# Patient Record
Sex: MALE | Race: BLACK OR AFRICAN AMERICAN | NOT HISPANIC OR LATINO | Employment: UNEMPLOYED | URBAN - METROPOLITAN AREA
[De-identification: names, ages, dates, MRNs, and addresses within clinical notes are randomized per-mention and may not be internally consistent; named-entity substitution may affect disease eponyms.]

---

## 2021-11-05 ENCOUNTER — HOSPITAL ENCOUNTER (EMERGENCY)
Facility: CLINIC | Age: 46
Discharge: HOME | End: 2021-11-05
Attending: EMERGENCY MEDICINE | Admitting: EMERGENCY MEDICINE

## 2021-11-05 VITALS
HEART RATE: 67 BPM | OXYGEN SATURATION: 100 % | DIASTOLIC BLOOD PRESSURE: 94 MMHG | WEIGHT: 190 LBS | TEMPERATURE: 98.6 F | SYSTOLIC BLOOD PRESSURE: 143 MMHG | RESPIRATION RATE: 18 BRPM

## 2021-11-05 DIAGNOSIS — H11.32 SUBCONJUNCTIVAL HEMORRHAGE OF LEFT EYE: ICD-10-CM

## 2021-11-05 PROCEDURE — 99283 EMERGENCY DEPT VISIT LOW MDM: CPT

## 2021-11-05 PROCEDURE — 250N000009 HC RX 250: Performed by: EMERGENCY MEDICINE

## 2021-11-05 RX ORDER — POLYMYXIN B SULFATE AND TRIMETHOPRIM 1; 10000 MG/ML; [USP'U]/ML
1-2 SOLUTION OPHTHALMIC
Qty: 4 ML | Refills: 0 | Status: SHIPPED | OUTPATIENT
Start: 2021-11-05 | End: 2021-11-12

## 2021-11-05 RX ORDER — TETRACAINE HYDROCHLORIDE 5 MG/ML
1 SOLUTION OPHTHALMIC ONCE
Status: COMPLETED | OUTPATIENT
Start: 2021-11-05 | End: 2021-11-05

## 2021-11-05 RX ADMIN — FLUORESCEIN SODIUM 600 MCG: 0.6 STRIP OPHTHALMIC at 14:39

## 2021-11-05 RX ADMIN — TETRACAINE HYDROCHLORIDE 1 DROP: 5 SOLUTION OPHTHALMIC at 14:39

## 2021-11-05 NOTE — ED PROVIDER NOTES
EMERGENCY DEPARTMENT ENCOUNTER      NAME: Scott Welhc  AGE: 46 year old male  YOB: 1975  MRN: [id number]  EVALUATION DATE & TIME: No admission date for patient encounter.    PCP: No primary care provider on file.    ED PROVIDER: Elie Hinojosa M.D.      Chief Complaint   Patient presents with     Eye Problem       FINAL IMPRESSION:  1. Subconjunctival hemorrhage of left eye        ED COURSE & MEDICAL DECISION MAKIN year old male presents to the Emergency Department for evaluation of left eye redness.  History of remote retinal detachments.  His reported visual acuity at baseline is light perception only.  He has redness to the entire inferior aspect of the left eye which is dense and consistent with a some conjunctival hemorrhage.  Fluorescein exam does not reveal any discrete areas of increased uptake.  There was no trauma.  He has baseline left upward eye deviation.  I recommended that he follows up as soon as he can with [facility]  given his complex history however I think his presentation today seems consistent with a simple subconjunctival hemorrhage.  He will be given Polytrim drops given the frequent infections and the complexity of his history.  He should follow-up as soon as possible in [facility].  Patient with the emergency department in stable addition    2:16 PM I met with the patient to gather history and to perform my initial exam. I discussed the plan for care while in the Emergency Department. PPE (gloves, eye protection, N95 mask) was worn by me during patient encounters while patient wore mask.   2:38 PM I re-evaluated and updated patient. We discussed plans for discharge and patient is agreeable.      MEDICATIONS GIVEN IN THE EMERGENCY:  Medications   tetracaine (PONTOCAINE) 0.5 % ophthalmic solution 1 drop (1 drop Left Eye Given 21)   fluorescein (FUL-JAME) ophthalmic strip STRP 600 mcg (600 mcg Left Eye Given 21)       NEW  PRESCRIPTIONS STARTED AT TODAY'S ER VISIT  Discharge Medication List as of 11/5/2021  2:55 PM      START taking these medications    Details   trimethoprim-polymyxin b (POLYTRIM) 66757-0.1 UNIT/ML-% ophthalmic solution Place 1-2 drops Into the left eye every 4 hours (while awake) for 7 days, Disp-4 mL, R-0, Local Print                =================================================================    HPI    Patient information was obtained from: Patient    Use of : N/A         Scott Welch is a 46 year old male without a pertinent history who presents to this ED by walk in for evaluation of left eye redness.    Patient reports left eye redness for the past couple of days that has progressively worsened. Associated pain with blinking or moving his left eye. Denies known injury or trauma. Patient states at baseline he sees shadows and lights out of his left eye due to detached retina. Denies pain or visual disturbance to right eye. Patient does not wear glasses or contacts. Patient denies additional medical concerns or complaints at this time.      REVIEW OF SYSTEMS   All systems reviewed and negative except as noted in HPI.    PAST MEDICAL HISTORY:  No past medical history on file.    PAST SURGICAL HISTORY:  No past surgical history on file.        CURRENT MEDICATIONS:    No current facility-administered medications for this encounter.     Current Outpatient Medications   Medication     trimethoprim-polymyxin b (POLYTRIM) 13635-1.1 UNIT/ML-% ophthalmic solution         ALLERGIES:  No Known Allergies    FAMILY HISTORY:  No family history on file.    SOCIAL HISTORY:   Social History     Socioeconomic History     Marital status: Single     Spouse name: Not on file     Number of children: Not on file     Years of education: Not on file     Highest education level: Not on file   Occupational History     Not on file   Tobacco Use     Smoking status: Not on file   Substance and Sexual Activity     Alcohol use:  Not on file     Drug use: Not on file     Sexual activity: Not on file   Other Topics Concern     Not on file   Social History Narrative     Not on file     Social Determinants of Health     Financial Resource Strain:      Difficulty of Paying Living Expenses:    Food Insecurity:      Worried About Running Out of Food in the Last Year:      Ran Out of Food in the Last Year:    Transportation Needs:      Lack of Transportation (Medical):      Lack of Transportation (Non-Medical):    Physical Activity:      Days of Exercise per Week:      Minutes of Exercise per Session:    Stress:      Feeling of Stress :    Social Connections:      Frequency of Communication with Friends and Family:      Frequency of Social Gatherings with Friends and Family:      Attends [employer] Services:      Active Member of Clubs or Organizations:      Attends Club or Organization Meetings:      Marital Status:    Intimate Partner Violence:      Fear of Current or Ex-Partner:      Emotionally Abused:      Physically Abused:      Sexually Abused:        VITALS:  BP (!) 143/94   Pulse 67   Temp 98.6  F (37  C) (Oral)   Resp 18   Wt 86.2 kg (190 lb)   SpO2 100%     PHYSICAL EXAM    Constitutional: Well developed, Well nourished, NAD.  HENT: Normocephalic, Atraumatic. Neck Supple.  Eyes: Right eye normal.  Left eye Lids and lashes normal.  There is chronic left upward deviation of the eye.  Conjunctiva is injected with some conjunctival hemorrhage extending from the medial aspect to the lateral aspect of the lower half of the eye.  Corneas clear.  Fluorescein exam with no areas of increased uptake.  Respiratory: Breathing comfortably on room air. Speaks full sentences easily. Lungs clear to ascultation.  Cardiovascular: Normal heart rate, Regular rhythm. No peripheral edema.  Abdomen: Soft  Musculoskeletal: Good range of motion in all major joints. No major deformities noted.  Integument: Warm, Dry.  Neurologic: Alert & awake, Normal motor  function, Normal sensory function, No focal deficits noted.   Psychiatric: Cooperative. Affect appropriate.      I, Rosa Mderic FordeFany, am serving as a scribe to document services personally performed by Dr. Elie Hinojosa, based on my observation and the provider's statements to me. I, Elie Hinojosa MD attest that Rosa M Soares is acting in a scribe capacity, has observed my performance of the services and has documented them in accordance with my direction.    Elie Hinojosa M.D.  Emergency Medicine  [facility] EMERGENCY ROOM  [address] [id number]  [phone]  Dept: [phone]     Elie Hinojosa MD  11/05/21 3187

## 2021-11-05 NOTE — DISCHARGE INSTRUCTIONS
He was seen today in the emergency department at [facility] for left eye redness.  Your evaluation revealed a subconjunctival hemorrhage which is usually caused by a burst blood vessel on the eyes.  This usually is not an acute eye or sight threatening process.  Given your complex history we would advise that you follow-up as soon as possible with the specialists at [facility] to have a full eye exam and review your symptoms next week.  In the meantime we are going to recommend a eyedrop with antimicrobial properties you can use every 4 hours while you are awake.

## 2021-11-05 NOTE — ED TRIAGE NOTES
patient reports that his L eye has been red for the past few days, no known injury.  Patient having vision is affected, Dr. Micaela MD assessing pt in triage.  Carin Kumar RN.......11/5/2021 2:22 PM

## 2022-01-20 ENCOUNTER — HOSPITAL ENCOUNTER (EMERGENCY)
Facility: CLINIC | Age: 47
Discharge: HOME | End: 2022-01-20
Attending: EMERGENCY MEDICINE | Admitting: EMERGENCY MEDICINE
Payer: OTHER GOVERNMENT

## 2022-01-20 VITALS
OXYGEN SATURATION: 99 % | SYSTOLIC BLOOD PRESSURE: 142 MMHG | RESPIRATION RATE: 16 BRPM | HEART RATE: 62 BPM | WEIGHT: 185 LBS | TEMPERATURE: 97.8 F | DIASTOLIC BLOOD PRESSURE: 80 MMHG

## 2022-01-20 DIAGNOSIS — U07.1 INFECTION DUE TO 2019 NOVEL CORONAVIRUS: ICD-10-CM

## 2022-01-20 LAB
FLUAV RNA SPEC QL NAA+PROBE: NEGATIVE
FLUBV RNA RESP QL NAA+PROBE: NEGATIVE
SARS-COV-2 RNA RESP QL NAA+PROBE: POSITIVE

## 2022-01-20 PROCEDURE — C9803 HOPD COVID-19 SPEC COLLECT: HCPCS

## 2022-01-20 PROCEDURE — 99283 EMERGENCY DEPT VISIT LOW MDM: CPT

## 2022-01-20 PROCEDURE — 87636 SARSCOV2 & INF A&B AMP PRB: CPT | Performed by: EMERGENCY MEDICINE

## 2022-01-20 ASSESSMENT — ENCOUNTER SYMPTOMS
MYALGIAS: 1
SHORTNESS OF BREATH: 0
COUGH: 1
HEADACHES: 1
SORE THROAT: 1
FATIGUE: 1

## 2022-01-20 NOTE — ED PROVIDER NOTES
EMERGENCY DEPARTMENT ENCOUNTER      NAME: Scott Welch Jr.  AGE: 46 year old male  YOB: 1975  MRN: [id number]  EVALUATION DATE & TIME: 2022  3:01 PM    PCP: No Ref-Primary, Physician    ED PROVIDER: Dipak Marques DO      Chief Complaint   Patient presents with     Cough     Covid Concern         FINAL IMPRESSION:  1. Infection due to 2019 novel coronavirus          ED COURSE & MEDICAL DECISION MAKIN-year-old male presented to the ED for evaluation of cough, body aches, chills, headache, sore throat, and fatigue that been ongoing for 1 week.  Patient denied any significant chest pain or shortness of breath, however.  In the ED the patient was noted to be slightly hypertensive but he was otherwise hemodynamically stable.  His O2 sats were 99% on room air.  The patient was not ill-appearing and he did not appear to have any respiratory difficulty at the time of his initial evaluation. The patient's lungs were clear to auscultation and the remainder of his physical exam was otherwise reassuring as well.      The patient's COVID test was positive here in the ED.  The patient was informed of the positive test and educated about COVID.  Because he is hemodynamically stable with mild symptoms no further treatment is needed here in clinic.  After educating reassure the patient he felt comfortable returning home.  He was instructed to follow-up with his work to determine if the required him to quarantine further.  He was instructed to return back to ED for any worsening respiratory difficulty or any other new or concerning symptoms.        Pertinent Labs & Imaging studies reviewed. (See chart for details)  3:30 PM I met with the patient to gather history and to perform my initial exam. We discussed plans for the ED course, including diagnostic testing and treatment.            At the conclusion of the encounter I discussed the results of all of the tests and the disposition. The questions were  answered. The patient or family acknowledged understanding and was agreeable with the care plan.     PPE worn: n95 mask, goggles    MEDICATIONS GIVEN IN THE EMERGENCY:  Medications - No data to display    NEW PRESCRIPTIONS STARTED AT TODAY'S ER VISIT  There are no discharge medications for this patient.         =================================================================    HPI    Patient information was obtained from: Patient    Use of : N/A        Scott Welch Jr. is a 46 year old male without a pertinent history who presents to this ED by private vehicle for evaluation of COVID testing.    Patient reports that about one week ago patient developed body aches, cough, fatigue, headache, and shortness of breath. He was told yesterday that he was exposed to COVID by a coworker, so he presented today for evaluation. Denies any shortness of breath, cough, or additional medical concerns or complaints at this time.     Of note, patient is a smoker. He is unvaccinated against COVID.       REVIEW OF SYSTEMS   Review of Systems   Constitutional: Positive for fatigue.   HENT: Positive for sore throat.    Respiratory: Positive for cough. Negative for shortness of breath.    Cardiovascular: Negative for chest pain.   Musculoskeletal: Positive for myalgias.   Neurological: Positive for headaches.   All other systems reviewed and are negative.      PAST MEDICAL HISTORY:  No past medical history on file.    PAST SURGICAL HISTORY:  No past surgical history on file.        CURRENT MEDICATIONS:    No current outpatient medications on file.      ALLERGIES:  No Known Allergies    FAMILY HISTORY:  No family history on file.    SOCIAL HISTORY:   Social History     Socioeconomic History     Marital status: Single     Spouse name: Not on file     Number of children: Not on file     Years of education: Not on file     Highest education level: Not on file   Occupational History     Not on file   Tobacco Use     Smoking  status: Not on file     Smokeless tobacco: Not on file   Substance and Sexual Activity     Alcohol use: Not on file     Drug use: Not on file     Sexual activity: Not on file   Other Topics Concern     Not on file   Social History Narrative     Not on file     Social Determinants of Health     Financial Resource Strain: Not on file   Food Insecurity: Not on file   Transportation Needs: Not on file   Physical Activity: Not on file   Stress: Not on file   Social Connections: Not on file   Intimate Partner Violence: Not on file   Housing Stability: Not on file       VITALS:  BP (!) 142/80   Pulse 62   Temp 97.8  F (36.6  C) (Temporal)   Resp 16   Wt 83.9 kg (185 lb)   SpO2 99%     PHYSICAL EXAM    General presentation: Alert, Vital signs reviewed. NAD  HENT: ENT inspection is normal. Oropharynx is moist and clear.   Eye: Pupils are equal and reactive to light. EOMI  Neck: The neck is supple, with full ROM, with no evidence of meningismus.  Pulmonary: Currently in no acute respiratory distress. Normal, non labored respirations, the lung sounds are normal with good equal air movement. Clear to auscultation bilaterally.   Circulatory: Regular rate and rhythm. Peripheral pulses are strong and equal. No murmurs, rubs, or gallops.   Abdominal: The abdomen is soft. Nontender. No rigidity, guarding, or rebound. Bowel sounds normal.   Neurologic: Alert, oriented to person, place, and time. No motor deficit. No sensory deficit. Cranial nerves II through XII are intact.  Musculoskeletal: No extremity tenderness. Full range of motion in all extremities. No extremity edema.   Skin: Skin color is normal. No rash. Warm. Dry to touch.      LAB:  All pertinent labs reviewed and interpreted.  Results for orders placed or performed during the hospital encounter of 01/20/22   Symptomatic; Unknown Influenza A/B & SARS-CoV2 (COVID-19) Virus PCR Multiplex Nasopharyngeal    Specimen: Nasopharyngeal; Swab   Result Value Ref Range     Influenza A PCR Negative Negative    Influenza B PCR Negative Negative    SARS CoV2 PCR Positive (A) Negative           I, Kiley Van, am serving as a scribe to document services personally performed by Dipak Marques DO based on my observation and the provider's statements to me. I, Dipak Marques, attest that Kiley Van is acting in a scribe capacity, has observed my performance of the services and has documented them in accordance with my direction.    Dipak Marques DO  Emergency Medicine  [facility] EMERGENCY ROOM  [address] [id number] [phone]       Dipak Marques DO  01/20/22 1912

## 2022-01-20 NOTE — DISCHARGE INSTRUCTIONS
Your test for COVID-19 was positive.  No further treatment is necessary at this time because your symptoms are already improving.  Follow-up with your primary care physician for reevaluation or return back to ED for any worsening respiratory difficulty or any other new or concerning symptoms.